# Patient Record
(demographics unavailable — no encounter records)

---

## 2024-11-29 NOTE — HISTORY OF PRESENT ILLNESS
[Gradual] : gradual [Sudden] : sudden [7] : 7 [0] : 0 [Sharp] : sharp [Frequent] : frequent [Leisure] : leisure [Sleep] : sleep [Rest] : rest [Standing] : standing [Walking] : walking [Exercising] : exercising [Stairs] : stairs [de-identified] : 11/29/2024  :BRYANNA GODDARD , he is known to be a student in [employer], [place] in [place].  He states while running 3 to 4 weeks ago he jumped from a height of approximately 4 to 5 feet with his knees extended.  Symptoms have been he has had pain over the medial distal third tibia distribution worse on the right.  He has been limping.  He has not been able to tolerate his running or increased level activities.  He has not tried any medications for this [] : no [FreeTextEntry1] : Prasanth Tib Fib [FreeTextEntry3] : 3 weeks

## 2024-11-29 NOTE — PHYSICAL EXAM
[Bilateral] : foot and ankle bilaterally [NL (40)] : plantar flexion 40 degrees [NL 30)] : inversion 30 degrees [NL (20)] : eversion 20 degrees [5___] : plantar flexion 5[unfilled]/5 [2+] : posterior tibialis pulse: 2+ [] : Sensation present to light touch in all distributions

## 2024-11-29 NOTE — DISCUSSION/SUMMARY
[de-identified] : - We have discussed his diagnosis possible stress fracture more likely on the right/periostitis. -Discussed the role of as needed ibuprofen 2-3 times a day -Since the right is more problematic we will get MRI to evaluate possible tibial stress fracture -We will get him into a tall cam boot for ambulation -Follow-up after the MRI

## 2024-11-29 NOTE — IMAGING
[Bilateral] :  tibia/fibula bilaterally [There are no fractures, subluxations or dislocations. No significant abnormalities are seen] : There are no fractures, subluxations or dislocations. No significant abnormalities are seen [de-identified] : Bilateral lower extremities the right is more symptomatic than the left- There is no swelling there is no erythema there is no deformity.  He has full painless range of motion of bilateral knees.  There is no tenderness to palpation over the medial joint lines negative Bart test, negative Lachman, negative pain with varus or valgus stress.  He does have tenderness over the distal third medial aspect of the tibia